# Patient Record
Sex: MALE | HISPANIC OR LATINO | URBAN - METROPOLITAN AREA
[De-identification: names, ages, dates, MRNs, and addresses within clinical notes are randomized per-mention and may not be internally consistent; named-entity substitution may affect disease eponyms.]

---

## 2023-05-28 ENCOUNTER — OFFICE VISIT (OUTPATIENT)
Dept: URGENT CARE | Facility: CLINIC | Age: 48
End: 2023-05-28

## 2023-05-28 VITALS
SYSTOLIC BLOOD PRESSURE: 114 MMHG | TEMPERATURE: 97.7 F | HEIGHT: 66 IN | WEIGHT: 223.1 LBS | BODY MASS INDEX: 35.86 KG/M2 | DIASTOLIC BLOOD PRESSURE: 90 MMHG | HEART RATE: 86 BPM | RESPIRATION RATE: 16 BRPM | OXYGEN SATURATION: 96 %

## 2023-05-28 DIAGNOSIS — M54.50 RIGHT LUMBAR PAIN: ICD-10-CM

## 2023-05-28 LAB
APPEARANCE UR: CLEAR
BILIRUB UR STRIP-MCNC: NEGATIVE MG/DL
COLOR UR AUTO: YELLOW
GLUCOSE UR STRIP.AUTO-MCNC: NEGATIVE MG/DL
KETONES UR STRIP.AUTO-MCNC: NEGATIVE MG/DL
LEUKOCYTE ESTERASE UR QL STRIP.AUTO: NEGATIVE
NITRITE UR QL STRIP.AUTO: NEGATIVE
PH UR STRIP.AUTO: 5.5 [PH] (ref 5–8)
PROT UR QL STRIP: NEGATIVE MG/DL
RBC UR QL AUTO: NEGATIVE
SP GR UR STRIP.AUTO: 1.01
UROBILINOGEN UR STRIP-MCNC: 0.2 MG/DL

## 2023-05-28 PROCEDURE — 3074F SYST BP LT 130 MM HG: CPT | Performed by: NURSE PRACTITIONER

## 2023-05-28 PROCEDURE — 81002 URINALYSIS NONAUTO W/O SCOPE: CPT | Performed by: NURSE PRACTITIONER

## 2023-05-28 PROCEDURE — 3080F DIAST BP >= 90 MM HG: CPT | Performed by: NURSE PRACTITIONER

## 2023-05-28 PROCEDURE — 99203 OFFICE O/P NEW LOW 30 MIN: CPT | Performed by: NURSE PRACTITIONER

## 2023-05-28 RX ORDER — PREDNISONE 20 MG/1
TABLET ORAL
Qty: 11 TABLET | Refills: 0 | Status: SHIPPED | OUTPATIENT
Start: 2023-05-28 | End: 2024-03-12

## 2023-05-28 RX ORDER — CYCLOBENZAPRINE HCL 5 MG
5-10 TABLET ORAL EVERY 8 HOURS PRN
Qty: 30 TABLET | Refills: 0 | Status: SHIPPED | OUTPATIENT
Start: 2023-05-28 | End: 2024-03-12

## 2023-05-28 RX ORDER — LIDOCAINE 5% 5 G/100G
1 CREAM TOPICAL EVERY 8 HOURS PRN
Qty: 30 G | Refills: 0 | Status: SHIPPED | OUTPATIENT
Start: 2023-05-28 | End: 2024-03-12

## 2023-05-28 ASSESSMENT — ENCOUNTER SYMPTOMS
CONSTITUTIONAL NEGATIVE: 1
FEVER: 0
ABDOMINAL PAIN: 0
NAUSEA: 0
WEAKNESS: 0
NEUROLOGICAL NEGATIVE: 1
BOWEL INCONTINENCE: 0
BACK PAIN: 1
CARDIOVASCULAR NEGATIVE: 1
RESPIRATORY NEGATIVE: 1
VOMITING: 0
SENSORY CHANGE: 0
FLANK PAIN: 0
CHILLS: 0
PERIANAL NUMBNESS: 0

## 2023-05-28 ASSESSMENT — VISUAL ACUITY: OU: 1

## 2023-05-29 NOTE — PROGRESS NOTES
Subjective:     Eric Guerrero is a 47 y.o. male who presents for Back Pain (X 2 days, started Friday night)       Back Pain  This is a new problem. Episode onset: Friday night. The problem has been gradually worsening since onset. The pain is present in the lumbar spine (Right). The pain is moderate. The symptoms are aggravated by bending and position. Pertinent negatives include no abdominal pain, bladder incontinence, bowel incontinence, dysuria, fever, perianal numbness or weakness.     Denies injury. Denies prior hx of back issues.    Review of Systems   Constitutional: Negative.  Negative for chills, fever and malaise/fatigue.   Respiratory: Negative.     Cardiovascular: Negative.    Gastrointestinal:  Negative for abdominal pain, bowel incontinence, nausea and vomiting.   Genitourinary: Negative.  Negative for bladder incontinence, dysuria and flank pain.   Musculoskeletal:  Positive for back pain.   Neurological: Negative.  Negative for sensory change and weakness.   All other systems reviewed and are negative.    Refer to HPI for additional details.    During this visit, appropriate PPE was worn, and hand hygiene was performed.    PMH:  has no past medical history on file.    MEDS:   Current Outpatient Medications:     IBUPROFEN PO, Take  by mouth., Disp: , Rfl:     predniSONE (DELTASONE) 20 MG Tab, Take 3 tabs daily x 2 days, then 2 tabs daily x 2 days, then 1 tab on final day., Disp: 11 Tablet, Rfl: 0    cyclobenzaprine (FLEXERIL) 5 mg tablet, Take 1-2 Tablets by mouth every 8 hours as needed for Muscle Spasms., Disp: 30 Tablet, Rfl: 0    Lidocaine 5 % Cream, Apply 1 Application. topically every 8 hours as needed (pain). Use thin layer., Disp: 30 g, Rfl: 0    ALLERGIES:   Allergies   Allergen Reactions    Nkda [No Known Drug Allergy]      SURGHX: History reviewed. No pertinent surgical history.  SOCHX:  reports that he has never smoked. He has never used smokeless tobacco. He reports current alcohol  "use. He reports that he does not use drugs.    FH: Per HPI as applicable/pertinent.      Objective:     BP (!) 114/90   Pulse 86   Temp 36.5 °C (97.7 °F) (Temporal)   Resp 16   Ht 1.666 m (5' 5.59\")   Wt 101 kg (223 lb 1.6 oz)   SpO2 96%   BMI 36.46 kg/m²     Physical Exam  Nursing note reviewed.   Constitutional:       General: He is not in acute distress.     Appearance: He is well-developed. He is not ill-appearing or toxic-appearing.   Eyes:      General: Vision grossly intact.   Cardiovascular:      Rate and Rhythm: Normal rate.   Pulmonary:      Effort: Pulmonary effort is normal. No respiratory distress.   Abdominal:      Tenderness: There is no right CVA tenderness or left CVA tenderness.   Musculoskeletal:         General: No deformity.      Lumbar back: Spasms (Right paraspinous musculature) present. No swelling, deformity, lacerations or tenderness. Decreased range of motion (Mild, due to pain). Negative right straight leg raise test and negative left straight leg raise test.   Skin:     General: Skin is warm and dry.      Coloration: Skin is not pale.      Findings: No bruising, erythema or rash.   Neurological:      Mental Status: He is alert and oriented to person, place, and time.      Motor: No weakness.   Psychiatric:         Behavior: Behavior normal. Behavior is cooperative.     UA: unremarkable      Assessment/Plan:     1. Right lumbar pain  POCT Urinalysis    predniSONE (DELTASONE) 20 MG Tab    cyclobenzaprine (FLEXERIL) 5 mg tablet    Lidocaine 5 % Cream      Rx as above sent electronically. [employer] card provided.    May use gentle massage, stretching, and range of motion exercises as tolerated. May apply heat up to 20 minutes at a time.    Differential diagnosis, natural history, supportive care, over-the-counter symptom management per 's instructions, close monitoring, and indications for immediate follow-up discussed.     Vital signs stable, afebrile, no acute distress at " this time. Warning signs reviewed. Return precautions discussed.     All questions answered. Patient agrees with the plan of care.    Discharge summary provided.

## 2024-03-12 ENCOUNTER — OFFICE VISIT (OUTPATIENT)
Dept: URGENT CARE | Facility: CLINIC | Age: 49
End: 2024-03-12

## 2024-03-12 VITALS
SYSTOLIC BLOOD PRESSURE: 136 MMHG | TEMPERATURE: 97.5 F | HEART RATE: 93 BPM | HEIGHT: 61 IN | BODY MASS INDEX: 43.05 KG/M2 | OXYGEN SATURATION: 98 % | DIASTOLIC BLOOD PRESSURE: 80 MMHG | WEIGHT: 228 LBS

## 2024-03-12 DIAGNOSIS — S39.012A STRAIN OF LUMBAR REGION, INITIAL ENCOUNTER: ICD-10-CM

## 2024-03-12 PROCEDURE — 99213 OFFICE O/P EST LOW 20 MIN: CPT | Performed by: PHYSICIAN ASSISTANT

## 2024-03-12 RX ORDER — CYCLOBENZAPRINE HCL 5 MG
5-10 TABLET ORAL 3 TIMES DAILY PRN
Qty: 30 TABLET | Refills: 0 | Status: SHIPPED | OUTPATIENT
Start: 2024-03-12

## 2024-03-12 RX ORDER — KETOROLAC TROMETHAMINE 30 MG/ML
15 INJECTION, SOLUTION INTRAMUSCULAR; INTRAVENOUS ONCE
Status: COMPLETED | OUTPATIENT
Start: 2024-03-12 | End: 2024-03-12

## 2024-03-12 RX ORDER — PREDNISONE 20 MG/1
TABLET ORAL
Qty: 10 TABLET | Refills: 0 | Status: SHIPPED | OUTPATIENT
Start: 2024-03-12

## 2024-03-12 RX ADMIN — KETOROLAC TROMETHAMINE 15 MG: 30 INJECTION, SOLUTION INTRAMUSCULAR; INTRAVENOUS at 16:27

## 2024-03-12 ASSESSMENT — ENCOUNTER SYMPTOMS
PERIANAL NUMBNESS: 0
PARESTHESIAS: 0
BACK PAIN: 1
FEVER: 0
LEG PAIN: 0
TINGLING: 0
ABDOMINAL PAIN: 0
NUMBNESS: 0
PARESIS: 0
BOWEL INCONTINENCE: 0
HEADACHES: 0
WEAKNESS: 0

## 2024-03-12 NOTE — PROGRESS NOTES
"Mike Guerrero is a very pleasant 48 y.o. male who presents with Back Pain (Patient states back pain that started on Saturday. States that pain was bad last night. States no injury. )            Back Pain  This is a new problem. The current episode started in the past 7 days. The problem occurs constantly. The problem is unchanged. The pain is present in the lumbar spine. The quality of the pain is described as aching. The pain does not radiate. The symptoms are aggravated by twisting, standing, sitting and position. Stiffness is present All day. Pertinent negatives include no abdominal pain, bladder incontinence, bowel incontinence, chest pain, dysuria, fever, headaches, leg pain, numbness, paresis, paresthesias, perianal numbness, tingling or weakness. He has tried muscle relaxant for the symptoms. The treatment provided mild relief.       PMH:  has no past medical history on file.  MEDS: No current outpatient medications on file.  ALLERGIES:   Allergies   Allergen Reactions    Nkda [No Known Drug Allergy]      SURGHX: No past surgical history on file.  SOCHX:  reports that he has never smoked. He has never used smokeless tobacco. He reports current alcohol use. He reports that he does not use drugs.  FH: family history is not on file.      Review of Systems   Constitutional:  Negative for fever.   Cardiovascular:  Negative for chest pain.   Gastrointestinal:  Negative for abdominal pain and bowel incontinence.   Genitourinary:  Negative for bladder incontinence and dysuria.   Musculoskeletal:  Positive for back pain.   Neurological:  Negative for tingling, weakness, numbness, headaches and paresthesias.       Medications, Allergies, and current problem list reviewed today in Epic           Objective     /80 (BP Location: Left arm, Patient Position: Sitting, BP Cuff Size: Large adult)   Pulse 93   Temp 36.4 °C (97.5 °F) (Temporal)   Ht 1.55 m (5' 1.02\")   Wt 103 kg (228 lb)   SpO2 " 98%   BMI 43.05 kg/m²      Physical Exam  Vitals and nursing note reviewed.   Constitutional:       General: He is not in acute distress.     Appearance: Normal appearance. He is well-developed. He is not diaphoretic.   HENT:      Head: Normocephalic.      Right Ear: Hearing and external ear normal.      Left Ear: Hearing and external ear normal.      Nose: Nose normal.      Mouth/Throat:      Mouth: Mucous membranes are moist.   Eyes:      General:         Right eye: No discharge.         Left eye: No discharge.      Conjunctiva/sclera: Conjunctivae normal.   Cardiovascular:      Rate and Rhythm: Normal rate and regular rhythm.      Pulses: Normal pulses.      Heart sounds: Normal heart sounds.   Pulmonary:      Effort: Pulmonary effort is normal. No respiratory distress.      Breath sounds: Normal breath sounds. No wheezing, rhonchi or rales.   Abdominal:      General: Abdomen is flat. There is no distension.      Palpations: Abdomen is soft.      Tenderness: There is no abdominal tenderness. There is no right CVA tenderness, left CVA tenderness, guarding or rebound.   Musculoskeletal:      Cervical back: Normal range of motion and neck supple.      Lumbar back: Swelling, edema, spasms and tenderness present. No deformity, signs of trauma or bony tenderness. Decreased range of motion. Negative right straight leg raise test and negative left straight leg raise test. No scoliosis.      Right lower leg: No edema.      Left lower leg: No edema.      Comments: Lumbar paraspinal muscular TTP radiating into the SI joint and gluteal region.  Localized edema and spasming noted.  No midline or bony tenderness.  No gross deformity, lesions, erythema, or ecchymosis.  Lower extremity strength and DTRs equal.  Gait appropriate.  Forward flexion limited secondary to pain.   Skin:     General: Skin is warm and dry.      Findings: No rash.   Neurological:      General: No focal deficit present.      Mental Status: He is alert and  oriented to person, place, and time. Mental status is at baseline.      Motor: No weakness.      Gait: Gait normal.      Deep Tendon Reflexes: Reflexes normal.   Psychiatric:         Mood and Affect: Mood normal.         Behavior: Behavior normal.         Thought Content: Thought content normal.         Judgment: Judgment normal.                             Assessment & Plan     This is a very pleasant 48-year-old male presenting with a bilateral lumbar muscle strain with spasm.  History of similar event last year.  There is no traumatic injury fall or events.  He is having stiffness and tenderness with certain movements.  No radicular symptoms.  Denies bowel or bladder incontinence, saddle anesthesia, fever, hematuria, abdominal pain or chest pain.  Vital signs normal.  Exam shows soft tissue TTP with spasming and edema.  No midline or bony tenderness.  No deformity.  Lower extremity strength DTRs and gait appropriate.  Toradol given in clinic, no adverse reaction.  OTC meds and conservative measures as discussed.      1. Strain of lumbar region, initial encounter  cyclobenzaprine (FLEXERIL) 5 mg tablet    predniSONE (DELTASONE) 20 MG Tab    ketorolac (Toradol) injection 15 mg            I personally reviewed prior external notes and test results pertinent to today's visit. Return to clinic or go to ED if symptoms worsen or persist. Red flag symptoms and indications for ED discussed at length. Patient/Parent/Guardian voices understanding.  AVS with post-visit instructions provided or given verbally.  Follow-up with your primary care provider in 3-5 days. All side effects and potential interactions of prescribed medication discussed including allergic response, GI upset, tendon injury, rash, sedation, OCP effectiveness, etc.    Please note that this dictation was created using voice recognition software. I have made every reasonable attempt to correct obvious errors, but I expect that there are errors of grammar and  possibly content that I did not discover before finalizing the note.